# Patient Record
Sex: MALE | Race: WHITE | URBAN - METROPOLITAN AREA
[De-identification: names, ages, dates, MRNs, and addresses within clinical notes are randomized per-mention and may not be internally consistent; named-entity substitution may affect disease eponyms.]

---

## 2022-12-27 ENCOUNTER — OFFICE VISIT (OUTPATIENT)
Dept: FAMILY MEDICINE CLINIC | Facility: CLINIC | Age: 57
End: 2022-12-27
Payer: COMMERCIAL

## 2022-12-27 VITALS
RESPIRATION RATE: 18 BRPM | BODY MASS INDEX: 36.4 KG/M2 | HEIGHT: 71 IN | SYSTOLIC BLOOD PRESSURE: 157 MMHG | WEIGHT: 260 LBS | HEART RATE: 97 BPM | DIASTOLIC BLOOD PRESSURE: 92 MMHG | TEMPERATURE: 98 F | OXYGEN SATURATION: 98 %

## 2022-12-27 DIAGNOSIS — J02.0 STREP PHARYNGITIS: Primary | ICD-10-CM

## 2022-12-27 LAB
CONTROL LINE PRESENT WITH A CLEAR BACKGROUND (YES/NO): YES YES/NO
STREP GRP A CUL-SCR: POSITIVE

## 2022-12-27 PROCEDURE — 3080F DIAST BP >= 90 MM HG: CPT | Performed by: NURSE PRACTITIONER

## 2022-12-27 PROCEDURE — 87880 STREP A ASSAY W/OPTIC: CPT | Performed by: NURSE PRACTITIONER

## 2022-12-27 PROCEDURE — 3008F BODY MASS INDEX DOCD: CPT | Performed by: NURSE PRACTITIONER

## 2022-12-27 PROCEDURE — 99203 OFFICE O/P NEW LOW 30 MIN: CPT | Performed by: NURSE PRACTITIONER

## 2022-12-27 PROCEDURE — 3077F SYST BP >= 140 MM HG: CPT | Performed by: NURSE PRACTITIONER

## 2022-12-27 RX ORDER — AMOXICILLIN 500 MG/1
500 CAPSULE ORAL 2 TIMES DAILY
Qty: 20 CAPSULE | Refills: 0 | Status: SHIPPED | OUTPATIENT
Start: 2022-12-27 | End: 2023-01-06

## 2022-12-27 RX ORDER — GLIMEPIRIDE 4 MG/1
TABLET ORAL
COMMUNITY
Start: 2022-10-19

## 2022-12-27 RX ORDER — SILDENAFIL 50 MG/1
50 TABLET, FILM COATED ORAL DAILY PRN
COMMUNITY
Start: 2022-12-09

## 2022-12-27 NOTE — PATIENT INSTRUCTIONS
1. Rest. Drink plenty of fluids. 2. Supportive care as discussed. Amoxicillin as prescribed. 3. Salt water gargles three times daily  4. Use humidifier at home when possible. 5. The rapid strep test is positive. 6. Covid-19 testing sent to lab. Self quarantine per CDC guidelines. 7. Follow up with PMD in 4-5 days for re-eval. Go to the emergency department immediately if symptoms worsen, change, you develop chest discomfort, wheezing, shortness of breath, or if you have any concerns.

## 2024-11-04 ENCOUNTER — OFFICE VISIT (OUTPATIENT)
Dept: FAMILY MEDICINE CLINIC | Facility: CLINIC | Age: 59
End: 2024-11-04
Payer: COMMERCIAL

## 2024-11-04 VITALS
WEIGHT: 261.63 LBS | BODY MASS INDEX: 36.63 KG/M2 | OXYGEN SATURATION: 97 % | TEMPERATURE: 98 F | RESPIRATION RATE: 14 BRPM | HEIGHT: 71 IN | DIASTOLIC BLOOD PRESSURE: 90 MMHG | SYSTOLIC BLOOD PRESSURE: 160 MMHG | HEART RATE: 91 BPM

## 2024-11-04 DIAGNOSIS — Z13.220 LIPID SCREENING: ICD-10-CM

## 2024-11-04 DIAGNOSIS — Z12.11 COLON CANCER SCREENING: ICD-10-CM

## 2024-11-04 DIAGNOSIS — Z13.0 SCREENING FOR DEFICIENCY ANEMIA: ICD-10-CM

## 2024-11-04 DIAGNOSIS — E78.49 OTHER HYPERLIPIDEMIA: ICD-10-CM

## 2024-11-04 DIAGNOSIS — R03.0 ELEVATED BLOOD PRESSURE READING: ICD-10-CM

## 2024-11-04 DIAGNOSIS — E11.9 TYPE 2 DIABETES MELLITUS WITHOUT COMPLICATION, WITHOUT LONG-TERM CURRENT USE OF INSULIN (HCC): ICD-10-CM

## 2024-11-04 DIAGNOSIS — Z12.5 PROSTATE CANCER SCREENING: ICD-10-CM

## 2024-11-04 DIAGNOSIS — Z00.00 WELLNESS EXAMINATION: Primary | ICD-10-CM

## 2024-11-04 DIAGNOSIS — Z13.29 THYROID DISORDER SCREEN: ICD-10-CM

## 2024-11-04 DIAGNOSIS — E66.9 OBESITY (BMI 35.0-39.9 WITHOUT COMORBIDITY): ICD-10-CM

## 2024-11-04 DIAGNOSIS — R73.09 ELEVATED GLUCOSE: ICD-10-CM

## 2024-11-04 LAB
ALBUMIN SERPL-MCNC: 4.2 G/DL (ref 3.2–4.8)
ALBUMIN/GLOB SERPL: 1.1 {RATIO} (ref 1–2)
ALP LIVER SERPL-CCNC: 98 U/L
ALT SERPL-CCNC: 25 U/L
ANION GAP SERPL CALC-SCNC: 3 MMOL/L (ref 0–18)
AST SERPL-CCNC: 32 U/L (ref ?–34)
BASOPHILS # BLD AUTO: 0.06 X10(3) UL (ref 0–0.2)
BASOPHILS NFR BLD AUTO: 0.6 %
BILIRUB SERPL-MCNC: 1.8 MG/DL (ref 0.2–1.1)
BUN BLD-MCNC: 12 MG/DL (ref 9–23)
CALCIUM BLD-MCNC: 10.9 MG/DL (ref 8.7–10.4)
CHLORIDE SERPL-SCNC: 100 MMOL/L (ref 98–112)
CHOLEST SERPL-MCNC: 177 MG/DL (ref ?–200)
CO2 SERPL-SCNC: 31 MMOL/L (ref 21–32)
COMPLEXED PSA SERPL-MCNC: 0.37 NG/ML (ref ?–4)
CREAT BLD-MCNC: 0.97 MG/DL
EGFRCR SERPLBLD CKD-EPI 2021: 89 ML/MIN/1.73M2 (ref 60–?)
EOSINOPHIL # BLD AUTO: 0.39 X10(3) UL (ref 0–0.7)
EOSINOPHIL NFR BLD AUTO: 4 %
ERYTHROCYTE [DISTWIDTH] IN BLOOD BY AUTOMATED COUNT: 11.9 %
FASTING PATIENT LIPID ANSWER: NO
FASTING STATUS PATIENT QL REPORTED: NO
GLOBULIN PLAS-MCNC: 3.9 G/DL (ref 2–3.5)
GLUCOSE BLD-MCNC: 241 MG/DL (ref 70–99)
HCT VFR BLD AUTO: 46.2 %
HDLC SERPL-MCNC: 45 MG/DL (ref 40–59)
HGB BLD-MCNC: 16.2 G/DL
IMM GRANULOCYTES # BLD AUTO: 0.02 X10(3) UL (ref 0–1)
IMM GRANULOCYTES NFR BLD: 0.2 %
LDLC SERPL CALC-MCNC: 87 MG/DL (ref ?–100)
LYMPHOCYTES # BLD AUTO: 3.05 X10(3) UL (ref 1–4)
LYMPHOCYTES NFR BLD AUTO: 30.9 %
MCH RBC QN AUTO: 30.5 PG (ref 26–34)
MCHC RBC AUTO-ENTMCNC: 35.1 G/DL (ref 31–37)
MCV RBC AUTO: 86.8 FL
MONOCYTES # BLD AUTO: 0.72 X10(3) UL (ref 0.1–1)
MONOCYTES NFR BLD AUTO: 7.3 %
NEUTROPHILS # BLD AUTO: 5.62 X10 (3) UL (ref 1.5–7.7)
NEUTROPHILS # BLD AUTO: 5.62 X10(3) UL (ref 1.5–7.7)
NEUTROPHILS NFR BLD AUTO: 57 %
NONHDLC SERPL-MCNC: 132 MG/DL (ref ?–130)
OSMOLALITY SERPL CALC.SUM OF ELEC: 286 MOSM/KG (ref 275–295)
PLATELET # BLD AUTO: 273 10(3)UL (ref 150–450)
POTASSIUM SERPL-SCNC: 4.3 MMOL/L (ref 3.5–5.1)
PROT SERPL-MCNC: 8.1 G/DL (ref 5.7–8.2)
RBC # BLD AUTO: 5.32 X10(6)UL
SODIUM SERPL-SCNC: 134 MMOL/L (ref 136–145)
TRIGL SERPL-MCNC: 271 MG/DL (ref 30–149)
TSI SER-ACNC: 3.03 UIU/ML (ref 0.55–4.78)
VLDLC SERPL CALC-MCNC: 44 MG/DL (ref 0–30)
WBC # BLD AUTO: 9.9 X10(3) UL (ref 4–11)

## 2024-11-04 PROCEDURE — 85025 COMPLETE CBC W/AUTO DIFF WBC: CPT

## 2024-11-04 PROCEDURE — 80061 LIPID PANEL: CPT

## 2024-11-04 PROCEDURE — 84443 ASSAY THYROID STIM HORMONE: CPT

## 2024-11-04 PROCEDURE — 83036 HEMOGLOBIN GLYCOSYLATED A1C: CPT

## 2024-11-04 PROCEDURE — 80053 COMPREHEN METABOLIC PANEL: CPT

## 2024-11-04 RX ORDER — GLIMEPIRIDE 4 MG/1
4 TABLET ORAL
Qty: 90 TABLET | Refills: 0 | Status: SHIPPED | OUTPATIENT
Start: 2024-11-04 | End: 2025-02-02

## 2024-11-04 NOTE — PROGRESS NOTES
HPI:   Eddie Lentz is a 59 year old male who presents for an Annual Health Visit.     He is a new patient to the office.  He just moved to the area from [place].    Works for [employer].  He reports a medical history of hyperglycemia and is on glimepiride 4mg BID for this.  Past medical record requested and show history of Diabetes and hypertension.  Patients blood pressure is elevated in office today.      Allergies:   No Known Allergies    CURRENT MEDICATIONS   Current Outpatient Medications   Medication Sig Dispense Refill    glimepiride 4 MG Oral Tab       Sildenafil Citrate 50 MG Oral Tab Take 50 mg by mouth daily as needed.        HISTORICAL INFORMATION   Past Medical History:    Diabetes (HCC)      Past Surgical History:   Procedure Laterality Date    Ankle fracture surgery      Appendectomy        No family history on file.   SOCIAL HISTORY   Social History     Socioeconomic History    Marital status: Single   Tobacco Use    Smoking status: Never    Smokeless tobacco: Never     Social History     Social History Narrative    Not on file        REVIEW OF SYSTEMS:     Review of Systems   Constitutional:  Negative for activity change, appetite change and fatigue.   HENT:  Negative for congestion, hearing loss and sore throat.    Eyes:  Negative for discharge and redness.   Respiratory:  Negative for shortness of breath and wheezing.    Cardiovascular:  Negative for chest pain.   Gastrointestinal:  Negative for abdominal distention and abdominal pain.   Endocrine: Negative for cold intolerance and heat intolerance.   Genitourinary:  Negative for difficulty urinating and urgency.   Musculoskeletal:  Negative for arthralgias and back pain.   Skin:  Negative for color change.   Allergic/Immunologic: Negative for environmental allergies.   Neurological:  Negative for dizziness, syncope and headaches.   Hematological:  Negative for adenopathy. Does not bruise/bleed easily.    Psychiatric/Behavioral:  Negative for agitation, behavioral problems and confusion.          EXAM:   /90   Pulse 91   Temp 98.2 °F (36.8 °C) (Temporal)   Resp 14   Ht 5' 11\" (1.803 m)   Wt 261 lb 9.6 oz (118.7 kg)   SpO2 97%   BMI 36.49 kg/m²    Wt Readings from Last 6 Encounters:   11/04/24 261 lb 9.6 oz (118.7 kg)   12/27/22 260 lb (117.9 kg)     Body mass index is 36.49 kg/m².    Physical Exam  Constitutional:       Appearance: Normal appearance. He is obese.   HENT:      Head: Normocephalic.      Right Ear: Tympanic membrane normal.      Left Ear: Tympanic membrane normal.      Nose: Nose normal.      Mouth/Throat:      Mouth: Mucous membranes are moist.   Eyes:      Pupils: Pupils are equal, round, and reactive to light.   Cardiovascular:      Rate and Rhythm: Normal rate and regular rhythm.      Pulses: Normal pulses.      Heart sounds: Normal heart sounds.   Pulmonary:      Effort: Pulmonary effort is normal.      Breath sounds: Normal breath sounds.   Abdominal:      General: Bowel sounds are normal.      Palpations: Abdomen is soft.   Musculoskeletal:         General: Normal range of motion.      Cervical back: Normal range of motion.   Skin:     General: Skin is dry.      Capillary Refill: Capillary refill takes less than 2 seconds.   Neurological:      Mental Status: He is alert and oriented to person, place, and time.   Psychiatric:         Mood and Affect: Mood normal.         Behavior: Behavior normal.          ASSESSMENT AND PLAN:   Eddie was seen today for physical.    Diagnoses and all orders for this visit:    Wellness examination  -     Comp Metabolic Panel (14); Future  -     CBC With Differential With Platelet; Future  -     Hemoglobin A1C; Future  -     Lipid Panel; Future  -     TSH W Reflex To Free T4; Future  -     Comp Metabolic Panel (14)  -     CBC With Differential With Platelet  -     Hemoglobin A1C  -     Lipid Panel  -     TSH W Reflex To Free T4    Colon cancer  screening  -     [facility] - INT (MUST PICK PROVIDER)    Elevated glucose  -     Hemoglobin A1C; Future  -     Hemoglobin A1C    Lipid screening  -     Lipid Panel; Future  -     Lipid Panel    Prostate cancer screening  -     PSA Screen; Future  -     PSA Screen    Screening for deficiency anemia  -     CBC With Differential With Platelet; Future  -     CBC With Differential With Platelet    Thyroid disorder screen  -     TSH W Reflex To Free T4; Future  -     TSH W Reflex To Free T4    Type 2 diabetes mellitus without complication, without long-term current use of insulin (HCC)  -     glimepiride 4 MG Oral Tab; Take 1 tablet (4 mg total) by mouth every morning before breakfast.  -     Tirzepatide (MOUNJARO) 2.5 MG/0.5ML Subcutaneous Solution Auto-injector; Inject 2.5 mg into the skin once a week.    Other hyperlipidemia    Obesity (BMI 35.0-39.9 without comorbidity)    Elevated blood pressure reading  Comments:  patient instructed to take his BP nightly for the next 5 days and contact office with readings.      The patient indicates understanding of these issues and agrees to the plan.    Problem List:  Patient Active Problem List   Diagnosis    Type 2 diabetes mellitus without complication, without long-term current use of insulin (HCC)    Other hyperlipidemia    Obesity (BMI 35.0-39.9 without comorbidity)     [facility]     LUZ MARIA Hale  11/4/2024  2:04 PM

## 2024-11-05 ENCOUNTER — TELEPHONE (OUTPATIENT)
Dept: FAMILY MEDICINE CLINIC | Facility: CLINIC | Age: 59
End: 2024-11-05

## 2024-11-05 LAB
EST. AVERAGE GLUCOSE BLD GHB EST-MCNC: 295 MG/DL (ref 68–126)
HBA1C MFR BLD: 11.9 % (ref ?–5.7)

## 2024-11-05 NOTE — TELEPHONE ENCOUNTER
Faxed over medical record request to [facility] to [phone].  Received conformation that it went through successfully.

## 2024-11-08 DIAGNOSIS — E11.9 TYPE 2 DIABETES MELLITUS WITHOUT COMPLICATION, WITHOUT LONG-TERM CURRENT USE OF INSULIN (HCC): Primary | ICD-10-CM

## 2024-11-08 DIAGNOSIS — E78.00 HYPERCHOLESTEROLEMIA: ICD-10-CM

## 2024-11-08 PROBLEM — E78.49 OTHER HYPERLIPIDEMIA: Status: ACTIVE | Noted: 2024-11-08

## 2024-11-08 PROBLEM — E66.9 OBESITY (BMI 35.0-39.9 WITHOUT COMORBIDITY): Status: ACTIVE | Noted: 2024-11-08

## 2024-11-08 RX ORDER — ATORVASTATIN CALCIUM 10 MG/1
10 TABLET, FILM COATED ORAL NIGHTLY
Qty: 90 TABLET | Refills: 0 | Status: SHIPPED | OUTPATIENT
Start: 2024-11-08 | End: 2025-02-06

## 2024-11-08 RX ORDER — TIRZEPATIDE 2.5 MG/.5ML
2.5 INJECTION, SOLUTION SUBCUTANEOUS WEEKLY
Qty: 3 ML | Refills: 0 | Status: SHIPPED | OUTPATIENT
Start: 2024-11-08 | End: 2024-12-08

## 2024-11-09 ENCOUNTER — MED REC SCAN ONLY (OUTPATIENT)
Dept: FAMILY MEDICINE CLINIC | Facility: CLINIC | Age: 59
End: 2024-11-09

## 2024-11-09 RX ORDER — ATORVASTATIN CALCIUM 10 MG/1
10 TABLET, FILM COATED ORAL NIGHTLY
Qty: 90 TABLET | Refills: 1 | Status: SHIPPED | OUTPATIENT
Start: 2024-11-09

## 2024-11-11 ENCOUNTER — NURSE ONLY (OUTPATIENT)
Dept: FAMILY MEDICINE CLINIC | Facility: CLINIC | Age: 59
End: 2024-11-11
Payer: COMMERCIAL

## 2024-11-11 NOTE — PROGRESS NOTES
Patient brought Mounjaro in to receive instructions how to self administer. Patient states that the Mounjaro was left out of the refrigerator in the car for about 24 hours. It was not in the sun or heat. Pharmacist at [facility] states that the medication is stable outside the refrigerator for 21 days as long as the temp was not under 30 degrees C or over 86 degrees F. Patient administered the injection subcutaneously two inches right of the umbilicus with correct technique.

## 2024-11-26 DIAGNOSIS — E11.9 TYPE 2 DIABETES MELLITUS WITHOUT COMPLICATION, WITHOUT LONG-TERM CURRENT USE OF INSULIN (HCC): ICD-10-CM

## 2024-11-26 RX ORDER — TIRZEPATIDE 2.5 MG/.5ML
2.5 INJECTION, SOLUTION SUBCUTANEOUS WEEKLY
Qty: 3 ML | Refills: 0 | Status: SHIPPED | OUTPATIENT
Start: 2024-11-26 | End: 2024-12-26

## 2024-11-29 ENCOUNTER — TELEPHONE (OUTPATIENT)
Dept: FAMILY MEDICINE CLINIC | Facility: CLINIC | Age: 59
End: 2024-11-29

## 2024-11-29 NOTE — TELEPHONE ENCOUNTER
Patient has some questions and isn't feeling well, would like to speak with nurse. Patient is taking mounjaro and is experiencing pain in stomach and diarrhea. Unsure if its caused from medication would like to speak with nurse.

## 2024-11-29 NOTE — TELEPHONE ENCOUNTER
Patient notified of provider recommendations/comments. Verbalized understanding and agrees to call office next week with status update.

## 2024-11-29 NOTE — TELEPHONE ENCOUNTER
Spoke with patient who states he started Mounjaro 3 weeks ago. About a week and a half ago he started feeling stomach discomfort. He states it feels like an air bubble in his stomach. He has tried GasX without relief. He states he feels bloated in the center of his stomach. Today he started with Diarrhea. He is wondering if this could be from the Mounjaro? He takes his next injection on Monday. He is wondering if a covering provider could review this. He denies any fever, nausea or vomiting. Please advise.

## 2024-11-29 NOTE — TELEPHONE ENCOUNTER
The symptoms he is describing very well may be from the Mounjaro.  I would ask him to hold his next dose to see if symptoms resolve over time.  I would ask him to maintain a very bland diet.  Please report symptoms to Dr. Govea after the weekend

## 2024-12-02 ENCOUNTER — TELEPHONE (OUTPATIENT)
Dept: FAMILY MEDICINE CLINIC | Facility: CLINIC | Age: 59
End: 2024-12-02

## 2024-12-02 DIAGNOSIS — E11.9 TYPE 2 DIABETES MELLITUS WITHOUT COMPLICATION, WITHOUT LONG-TERM CURRENT USE OF INSULIN (HCC): ICD-10-CM

## 2024-12-02 NOTE — TELEPHONE ENCOUNTER
Patient said that he had been taking Mounjaro and he was getting bad stomach cramps, nausea and diarrhea. He was advised to hold the dose and report symptoms.   He said that he has taken the injection 3x. The last dose was 1 week ago. He said he is feeling much better now. He said he heard of a study that the Mounjara can weaken the cells of the heart. He also stopped the Lipitor. Should he restart that?   He also aked if he should increase the Glimipiride to 2x daily, he has only been taking it once daily. He said he has not been checking his blood sugar because he needs to get his glucometer from his house.

## 2024-12-03 RX ORDER — GLIMEPIRIDE 4 MG/1
TABLET ORAL
Qty: 1 TABLET | Refills: 0 | Status: SHIPPED | OUTPATIENT
Start: 2024-12-03 | End: 2024-12-05

## 2024-12-03 NOTE — TELEPHONE ENCOUNTER
Yes, he should take 2 glimepiride and take his  lipitor and return this month to see me for BP recheck.

## 2024-12-03 NOTE — TELEPHONE ENCOUNTER
Patient advised.  Verbalizes understanding.  States he will call back to schedule appointment.   Medication list updated.

## 2024-12-05 ENCOUNTER — TELEPHONE (OUTPATIENT)
Dept: FAMILY MEDICINE CLINIC | Facility: CLINIC | Age: 59
End: 2024-12-05

## 2024-12-05 DIAGNOSIS — E11.9 TYPE 2 DIABETES MELLITUS WITHOUT COMPLICATION, WITHOUT LONG-TERM CURRENT USE OF INSULIN (HCC): ICD-10-CM

## 2024-12-05 RX ORDER — GLIMEPIRIDE 4 MG/1
8 TABLET ORAL
Qty: 180 TABLET | Refills: 0 | Status: SHIPPED | OUTPATIENT
Start: 2024-12-05 | End: 2025-03-05

## 2024-12-05 NOTE — TELEPHONE ENCOUNTER
glimepiride 4 MG Oral Tab   Calling to clarify the quantity. The dosing the directions do not add up.

## 2025-01-02 ENCOUNTER — TELEPHONE (OUTPATIENT)
Dept: FAMILY MEDICINE CLINIC | Facility: CLINIC | Age: 60
End: 2025-01-02

## 2025-01-02 DIAGNOSIS — E11.9 TYPE 2 DIABETES MELLITUS WITHOUT COMPLICATION, WITHOUT LONG-TERM CURRENT USE OF INSULIN (HCC): ICD-10-CM

## 2025-01-02 RX ORDER — GLIMEPIRIDE 4 MG/1
8 TABLET ORAL
Qty: 180 TABLET | Refills: 0 | Status: SHIPPED | OUTPATIENT
Start: 2025-01-02 | End: 2025-04-02

## 2025-02-08 ENCOUNTER — NURSE ONLY (OUTPATIENT)
Dept: FAMILY MEDICINE CLINIC | Facility: CLINIC | Age: 60
End: 2025-02-08
Payer: COMMERCIAL

## 2025-02-08 DIAGNOSIS — E78.00 HYPERCHOLESTEROLEMIA: ICD-10-CM

## 2025-02-08 DIAGNOSIS — E11.9 TYPE 2 DIABETES MELLITUS WITHOUT COMPLICATION, WITHOUT LONG-TERM CURRENT USE OF INSULIN (HCC): ICD-10-CM

## 2025-02-08 LAB
ALBUMIN SERPL-MCNC: 4.3 G/DL (ref 3.2–4.8)
ALBUMIN/GLOB SERPL: 1.4 {RATIO} (ref 1–2)
ALP LIVER SERPL-CCNC: 109 U/L
ALT SERPL-CCNC: 20 U/L
ANION GAP SERPL CALC-SCNC: 9 MMOL/L (ref 0–18)
AST SERPL-CCNC: 26 U/L (ref ?–34)
BILIRUB SERPL-MCNC: 1.6 MG/DL (ref 0.2–1.1)
BUN BLD-MCNC: 14 MG/DL (ref 9–23)
CALCIUM BLD-MCNC: 9.5 MG/DL (ref 8.7–10.6)
CHLORIDE SERPL-SCNC: 102 MMOL/L (ref 98–112)
CHOLEST SERPL-MCNC: 119 MG/DL (ref ?–200)
CO2 SERPL-SCNC: 26 MMOL/L (ref 21–32)
CREAT BLD-MCNC: 1.05 MG/DL
EGFRCR SERPLBLD CKD-EPI 2021: 81 ML/MIN/1.73M2 (ref 60–?)
EST. AVERAGE GLUCOSE BLD GHB EST-MCNC: 312 MG/DL (ref 68–126)
FASTING PATIENT LIPID ANSWER: YES
FASTING STATUS PATIENT QL REPORTED: YES
GLOBULIN PLAS-MCNC: 3.1 G/DL (ref 2–3.5)
GLUCOSE BLD-MCNC: 325 MG/DL (ref 70–99)
HBA1C MFR BLD: 12.5 % (ref ?–5.7)
HDLC SERPL-MCNC: 42 MG/DL (ref 40–59)
LDLC SERPL CALC-MCNC: 55 MG/DL (ref ?–100)
NONHDLC SERPL-MCNC: 77 MG/DL (ref ?–130)
OSMOLALITY SERPL CALC.SUM OF ELEC: 297 MOSM/KG (ref 275–295)
POTASSIUM SERPL-SCNC: 4.5 MMOL/L (ref 3.5–5.1)
PROT SERPL-MCNC: 7.4 G/DL (ref 5.7–8.2)
SODIUM SERPL-SCNC: 137 MMOL/L (ref 136–145)
TRIGL SERPL-MCNC: 123 MG/DL (ref 30–149)
VLDLC SERPL CALC-MCNC: 18 MG/DL (ref 0–30)

## 2025-02-08 PROCEDURE — 80053 COMPREHEN METABOLIC PANEL: CPT

## 2025-02-08 PROCEDURE — 80061 LIPID PANEL: CPT

## 2025-02-08 PROCEDURE — 36415 COLL VENOUS BLD VENIPUNCTURE: CPT

## 2025-02-08 PROCEDURE — 83036 HEMOGLOBIN GLYCOSYLATED A1C: CPT

## 2025-02-20 ENCOUNTER — TELEPHONE (OUTPATIENT)
Dept: FAMILY MEDICINE CLINIC | Facility: CLINIC | Age: 60
End: 2025-02-20

## 2025-02-20 ENCOUNTER — OFFICE VISIT (OUTPATIENT)
Dept: FAMILY MEDICINE CLINIC | Facility: CLINIC | Age: 60
End: 2025-02-20
Payer: COMMERCIAL

## 2025-02-20 VITALS
HEART RATE: 83 BPM | RESPIRATION RATE: 21 BRPM | OXYGEN SATURATION: 96 % | BODY MASS INDEX: 36.15 KG/M2 | DIASTOLIC BLOOD PRESSURE: 100 MMHG | SYSTOLIC BLOOD PRESSURE: 135 MMHG | TEMPERATURE: 98 F | HEIGHT: 71 IN | WEIGHT: 258.19 LBS

## 2025-02-20 DIAGNOSIS — I10 PRIMARY HYPERTENSION: ICD-10-CM

## 2025-02-20 DIAGNOSIS — E11.9 TYPE 2 DIABETES MELLITUS WITHOUT COMPLICATION, WITHOUT LONG-TERM CURRENT USE OF INSULIN (HCC): Primary | ICD-10-CM

## 2025-02-20 DIAGNOSIS — E78.00 HYPERCHOLESTEROLEMIA: ICD-10-CM

## 2025-02-20 LAB
CREAT UR-SCNC: 39.2 MG/DL
MICROALBUMIN UR-MCNC: <0.3 MG/DL

## 2025-02-20 PROCEDURE — 99214 OFFICE O/P EST MOD 30 MIN: CPT

## 2025-02-20 PROCEDURE — 82043 UR ALBUMIN QUANTITATIVE: CPT

## 2025-02-20 PROCEDURE — 82570 ASSAY OF URINE CREATININE: CPT

## 2025-02-20 RX ORDER — SEMAGLUTIDE 0.68 MG/ML
0.25 INJECTION, SOLUTION SUBCUTANEOUS WEEKLY
Qty: 1 EACH | Refills: 0 | Status: SHIPPED | OUTPATIENT
Start: 2025-02-20

## 2025-02-20 RX ORDER — MULTIVITAMIN
2 TABLET ORAL DAILY
COMMUNITY

## 2025-02-20 NOTE — TELEPHONE ENCOUNTER
Has new prescription and patient states that need a nurse visit to be shown how to properly use. Asked if OMAR Morillo was available for nurse visit, forwarding to clinical to schedule

## 2025-02-20 NOTE — TELEPHONE ENCOUNTER
Called Barrett and set up nurse appt. To teach him how to use Ozempic.    Future Appointments   Date Time Provider Department Center   2/24/2025  4:00 PM [facility] [place]

## 2025-02-20 NOTE — TELEPHONE ENCOUNTER
Authorization number: [id number]   Authorized from February 20, 2025 to February 20, 2026  Payer: [employer] Commerical Case ID: [id number]    Phone: [phone]     Fax: [phone]       Called Barrett and let him know.  Set up a nurse appt. To teach him how to use Ozempic.    Future Appointments   Date Time Provider Department Center   2/24/2025  4:00 PM [facility] [place]

## 2025-02-20 NOTE — TELEPHONE ENCOUNTER
Started Prior Authorization started 2/20/25 through Epic.  Awaiting response.

## 2025-02-20 NOTE — PROGRESS NOTES
Eddie Lentz is a 59 year old male.  HPI:     Patient in office for diabetic follow up.  He takes 4 mg of glimepiride BID.  Patient new to this office upon last visit 3 months ago.  He reported he did not have diabetes but was told by prior primary care physician he has hyperglycemia.  HgbA1c 3 months ago was 11.9 are medical record sent from prior office show history of diabetes.  He reports he was unable to tolerate metformin due to gi side effects.  He was started on monjaro but discontinued this due to side effects after 3 weeks.  He was instructed by covering provider to increase glimepiride to twice a day (was originally once a day) and instructed to follow in within 4 weeks.  Patient did not follow up.  He did his labs last week and ha1c is now 12.5.  Cholesterol in range.  Currently takes atorvastatin 10 mg nightly.    Blood pressure was elevated in office 3 months ago.  Patient has been monitoring his blood pressures at home with a wrist cuff.  Home numbers are 130's/80.  Spoke with patient about wrist cuffs not being as accurate and recommended an arm cuff for monitoring.  Per patients past medical records he has a history of hypertension.  He is not currently on any medication for this.      Current Outpatient Medications   Medication Sig Dispense Refill    glimepiride 4 MG Oral Tab Take 2 tablets (8 mg total) by mouth every morning before breakfast. Take two tablets to = 8 mg daily 180 tablet 0    atorvastatin 10 MG Oral Tab Take 1 tablet (10 mg total) by mouth nightly. 90 tablet 1    Sildenafil Citrate 50 MG Oral Tab Take 1 tablet (50 mg total) by mouth daily as needed.        Past Medical History:    Diabetes (HCC)      Social History:  Social History     Socioeconomic History    Marital status: Single   Tobacco Use    Smoking status: Never     Passive exposure: Never    Smokeless tobacco: Never   Vaping Use    Vaping status: Never Used   Substance and Sexual Activity    Drug use: Never           REVIEW OF SYSTEMS:   GENERAL HEALTH: feels well otherwise  SKIN: denies any unusual skin lesions or rashes  RESPIRATORY: denies shortness of breath with exertion  CARDIOVASCULAR: denies chest pain on exertion  GI: denies abdominal pain and denies heartburn  NEURO: denies headaches      EXAM:   BP (!) 135/100   Pulse 83   Temp 97.7 °F (36.5 °C) (Temporal)   Resp 21   Ht 5' 11\" (1.803 m)   Wt 258 lb 3.2 oz (117.1 kg)   SpO2 96%   BMI 36.01 kg/m²   GENERAL: well developed, well nourished,in no apparent distress  SKIN: no rashes,no suspicious lesions  HEENT: atraumatic, normocephalic,ears and throat are clear  NECK: supple,no adenopathy,no bruits  LUNGS: clear to auscultation  CARDIO: RRR without murmur  GI: good BS's,no masses, HSM or tenderness  EXTREMITIES: no cyanosis, clubbing or edema    Bilateral barefoot skin diabetic exam is normal, visualized feet and the appearance is normal.  Bilateral monofilament/sensation of both feet is normal.  Pulsation pedal pulse exam of both lower legs/feet is normal as well.       ASSESSMENT AND PLAN:     1. Type 2 diabetes mellitus without complication, without long-term current use of insulin (HCC)  Ozempic sent to pharmacy (PA initiated).  Spoke with patient that other GLP1 medications will likely have same side effects.  He reports a friend failing the first 2 injectable medications they tried but the 3rd one being successful.  He would like to try another injectable medication.  Spoke about patients average glucose being over 300 and needed to change diet.  Referral placed for diabetic education.   - OP REFERRAL TO DIAB NUTRITION MANAGEMENT 3 HRS  - Microalb/Creat Ratio, Random Urine; Future  - semaglutide (OZEMPIC, 0.25 OR 0.5 MG/DOSE,) 2 MG/3ML Subcutaneous Solution Pen-injector; Inject 0.25 mg into the skin once a week.  Dispense: 1 each; Refill: 0  - Microalb/Creat Ratio, Random Urine    2. Hypercholesterolemia  Continue atorvastatin nightly    3. Primary  hypertension  Recommended patient take blood pressures nightly for the next 3 nights and report readings to office.  Spoke with patient about likely needing medication management due to the diabetes and hyperlipidemia.     The patient indicates understanding of these issues and agrees to the plan.  The patient is asked to return in 3 months for diabetic follow up and sooner if needed.  Will be reaching out to patient in 4 days regarding blood pressures.     Leilani Flores, APRN  2/20/2025

## 2025-02-24 ENCOUNTER — NURSE ONLY (OUTPATIENT)
Dept: FAMILY MEDICINE CLINIC | Facility: CLINIC | Age: 60
End: 2025-02-24
Payer: COMMERCIAL

## 2025-02-24 NOTE — PROGRESS NOTES
Patient brought Ozempic from home. Lot # [id number] exp 8/31/27. He states he has kept it in the refrigerator. Instructions how to self administer medication reviewed. Needle applied to the pen, dose titrated to 0.25mg. Patient administered Ozempic 0.25mg subcutaneously to the right of the umbilicus using aseptic technique.

## 2025-03-20 ENCOUNTER — TELEPHONE (OUTPATIENT)
Dept: FAMILY MEDICINE CLINIC | Facility: CLINIC | Age: 60
End: 2025-03-20

## 2025-03-31 DIAGNOSIS — E11.9 TYPE 2 DIABETES MELLITUS WITHOUT COMPLICATION, WITHOUT LONG-TERM CURRENT USE OF INSULIN (HCC): ICD-10-CM

## 2025-03-31 RX ORDER — GLIMEPIRIDE 4 MG/1
8 TABLET ORAL
Qty: 180 TABLET | Refills: 0 | Status: SHIPPED | OUTPATIENT
Start: 2025-03-31

## 2025-03-31 NOTE — TELEPHONE ENCOUNTER
GLIMEPIRIDE 4 MG Oral Tab   Diabetes Medication Protocol [id number]/31/2025 02:35 PM   Protocol Details Last A1C < 7.5 and within past 6 months    Medication is active on med list    In person appointment or virtual visit in the past 6 mos or appointment in next 3 mos    Microalbumin procedure in past 12 months or taking ACE/ARB    EGFRCR or GFRNAA > 50    GFR in the past 12 months   Last office visit:  2/20/25   Future Appointments   Date Time Provider Department Center   5/20/2025 10:00 AM Leilani Flores APRN [facility] [place]   Last filled:  1/2/25  #180  Last labs: 2/20/25 micro/creat, 2/8/25 A1C: 12.5, eGFR-CR: 81  Last BP:  135/100

## 2025-04-01 DIAGNOSIS — E11.9 TYPE 2 DIABETES MELLITUS WITHOUT COMPLICATION, WITHOUT LONG-TERM CURRENT USE OF INSULIN (HCC): ICD-10-CM

## 2025-04-01 RX ORDER — SEMAGLUTIDE 0.68 MG/ML
0.25 INJECTION, SOLUTION SUBCUTANEOUS WEEKLY
Qty: 1 EACH | Refills: 0 | Status: SHIPPED | OUTPATIENT
Start: 2025-04-01

## 2025-04-01 NOTE — TELEPHONE ENCOUNTER
Patient needs a refill on-    semaglutide (OZEMPIC, 0.25 OR 0.5 MG/DOSE,) 2 MG/3ML Subcutaneous Solution Pen-injector     [facility] [address] [place] [address] [address] [phone], [phone]     Thank you

## 2025-04-01 NOTE — TELEPHONE ENCOUNTER
Last OV was with Leilani Flores APRN was on 2/20/25.   Last Hgba1c was 2/8/25  Last refill was 2/20/25 for 2mg

## 2025-05-12 ENCOUNTER — TELEPHONE (OUTPATIENT)
Dept: FAMILY MEDICINE CLINIC | Facility: CLINIC | Age: 60
End: 2025-05-12

## 2025-05-12 RX ORDER — SEMAGLUTIDE 0.68 MG/ML
0.5 INJECTION, SOLUTION SUBCUTANEOUS WEEKLY
Qty: 1 EACH | Refills: 12 | Status: SHIPPED | OUTPATIENT
Start: 2025-05-12 | End: 2026-05-05

## 2025-05-12 NOTE — TELEPHONE ENCOUNTER
Last Ov w/Leilani Flores APRN 2/20/25  Last Hgba1c was 2/8/25  Last refill 4/1/25 0.25mg  Does the dose need to be increased?

## 2025-05-15 ENCOUNTER — OFFICE VISIT (OUTPATIENT)
Dept: FAMILY MEDICINE CLINIC | Facility: CLINIC | Age: 60
End: 2025-05-15
Payer: COMMERCIAL

## 2025-05-15 VITALS
BODY MASS INDEX: 35 KG/M2 | DIASTOLIC BLOOD PRESSURE: 88 MMHG | SYSTOLIC BLOOD PRESSURE: 126 MMHG | TEMPERATURE: 98 F | OXYGEN SATURATION: 97 % | HEART RATE: 100 BPM | WEIGHT: 250 LBS

## 2025-05-15 DIAGNOSIS — L03.039 PARONYCHIA OF GREAT TOE: ICD-10-CM

## 2025-05-15 DIAGNOSIS — E11.9 TYPE 2 DIABETES MELLITUS WITHOUT COMPLICATION, WITHOUT LONG-TERM CURRENT USE OF INSULIN (HCC): Primary | ICD-10-CM

## 2025-05-15 LAB — HEMOGLOBIN A1C: 10.3 % (ref 4.3–5.6)

## 2025-05-15 PROCEDURE — 99214 OFFICE O/P EST MOD 30 MIN: CPT

## 2025-05-15 PROCEDURE — 83036 HEMOGLOBIN GLYCOSYLATED A1C: CPT

## 2025-05-15 RX ORDER — CEPHALEXIN 250 MG/5ML
500 POWDER, FOR SUSPENSION ORAL 3 TIMES DAILY
Qty: 300 ML | Refills: 0 | Status: SHIPPED | OUTPATIENT
Start: 2025-05-15 | End: 2025-05-25

## 2025-05-15 NOTE — PROGRESS NOTES
Eddie Lentz is a 59 year old male.  HPI:     Patient in office for diabetic follow up.  He was seen in office 3 months ago and diabetes was not controlled at that time with a hemaglobin HgbA1c of 12.5.  He was started on monjaro but discontinued after 2 doses due to side effects.  3 months ago he was started on ozempic.  He reports no side effects with ozempic and HgbA1c in office today is 10.3.    Reports pain to great toe that started one month ago.  He reports he injured his toe at work (stubbed his toe).  Reports constant pain and occasional blood.      Current Medications[1]   Past Medical History[2]   Social History:  Short Social Hx on File[3]       REVIEW OF SYSTEMS:     Review of Systems   Constitutional:  Negative for activity change, appetite change and fatigue.   HENT:  Negative for congestion, hearing loss and sore throat.    Eyes:  Negative for discharge and redness.   Respiratory:  Negative for shortness of breath and wheezing.    Cardiovascular:  Negative for chest pain.   Gastrointestinal:  Negative for abdominal distention and abdominal pain.   Endocrine: Negative for cold intolerance and heat intolerance.   Genitourinary:  Negative for difficulty urinating and urgency.   Musculoskeletal:  Negative for arthralgias and back pain.   Skin:  Negative for color change.   Allergic/Immunologic: Negative for environmental allergies.   Neurological:  Negative for dizziness, syncope and headaches.   Hematological:  Negative for adenopathy. Does not bruise/bleed easily.   Psychiatric/Behavioral:  Negative for agitation, behavioral problems and confusion.        EXAM:   /88   Pulse 100   Temp 97.9 °F (36.6 °C) (Temporal)   Wt 250 lb (113.4 kg)   SpO2 97%   BMI 34.87 kg/m²     Physical Exam  Constitutional:       Appearance: Normal appearance.   HENT:      Nose: Nose normal.      Mouth/Throat:      Mouth: Mucous membranes are moist.   Eyes:      Pupils: Pupils are equal, round, and  reactive to light.   Cardiovascular:      Rate and Rhythm: Normal rate and regular rhythm.      Pulses: Normal pulses.      Heart sounds: Normal heart sounds.   Pulmonary:      Effort: Pulmonary effort is normal.      Breath sounds: Normal breath sounds.   Musculoskeletal:         General: Normal range of motion.      Cervical back: Normal range of motion.   Skin:     General: Skin is dry.      Capillary Refill: Capillary refill takes less than 2 seconds.   Neurological:      Mental Status: He is alert and oriented to person, place, and time.   Psychiatric:         Mood and Affect: Mood normal.         Behavior: Behavior normal.          ASSESSMENT AND PLAN:     1. Type 2 diabetes mellitus without complication, without long-term current use of insulin ([facility])  HgbA1c 10.2 in office.  Patient to continue with ozempic 0.5 mg weekly and glimepiride 4 mg daily while continuing to watch diet and exercise.  - POC Hemoglobin A1C    2. Paronychia of great toe  Keflex sent to pharmacy and instructions provided  - cephALEXin 250 MG/5ML Oral Recon Susp; Take 10 mL (500 mg total) by mouth 3 (three) times daily for 10 days. For 10 days  Dispense: 300 mL; Refill: 0    The patient indicates understanding of these issues and agrees to the plan.      Leilani Flores, APRN  5/15/2025           [1]   Current Outpatient Medications   Medication Sig Dispense Refill    semaglutide (OZEMPIC, 0.25 OR 0.5 MG/DOSE,) 2 MG/3ML Subcutaneous Solution Pen-injector Inject 0.5 mg into the skin once a week for 52 doses. 1 each 12    GLIMEPIRIDE 4 MG Oral Tab TAKE 2 TABLETS BY MOUTH ONCE DAILY IN THE MORNING BEFORE BREAKFAST 180 tablet 0    Multiple Vitamin (MULTI-VITAMIN) Oral Tab Take 2 tablets by mouth daily.      atorvastatin 10 MG Oral Tab Take 1 tablet (10 mg total) by mouth nightly. 90 tablet 1    Sildenafil Citrate 50 MG Oral Tab Take 1 tablet (50 mg total) by mouth daily as needed.     [2]   Past Medical History:   Diabetes ([facility])   [3]   Social  History  Socioeconomic History    Marital status: Single   Tobacco Use    Smoking status: Never     Passive exposure: Never    Smokeless tobacco: Never   Vaping Use    Vaping status: Never Used   Substance and Sexual Activity    Drug use: Never     Social Drivers of Health     Food Insecurity: No Food Insecurity (2/20/2025)    NCSS - Food Insecurity     Worried About Running Out of Food in the Last Year: No     Ran Out of Food in the Last Year: No   Transportation Needs: No Transportation Needs (2/20/2025)    NCSS - Transportation     Lack of Transportation: No   Housing Stability: Not At Risk (2/20/2025)    NCSS - Housing/Utilities     Has Housing: Yes     Worried About Losing Housing: No     Unable to Get Utilities: No

## 2025-05-29 ENCOUNTER — TELEPHONE (OUTPATIENT)
Dept: FAMILY MEDICINE CLINIC | Facility: CLINIC | Age: 60
End: 2025-05-29

## 2025-05-29 DIAGNOSIS — L08.9 FOOT INFECTION: Primary | ICD-10-CM

## 2025-05-29 NOTE — TELEPHONE ENCOUNTER
Patient said that he finished the antibiotics Saturday. He said that he had been squeezing out pus. He said that  the toe is not longer draining or red but it is still painful if her bumps it. He said that he is just finishing the third round of antibiotics today.

## 2025-05-29 NOTE — TELEPHONE ENCOUNTER
Patient advised. He said I will just \"tough it out\".   Patient advised that he is at increased risk of infection and decreased circulation due to Diabetes so he should make an appointment. He said he is in [place] right now. Contact information sent to patient through [employer].

## 2025-07-05 DIAGNOSIS — E11.9 TYPE 2 DIABETES MELLITUS WITHOUT COMPLICATION, WITHOUT LONG-TERM CURRENT USE OF INSULIN (HCC): ICD-10-CM

## 2025-07-05 RX ORDER — GLIMEPIRIDE 4 MG/1
8 TABLET ORAL
Qty: 180 TABLET | Refills: 0 | Status: SHIPPED | OUTPATIENT
Start: 2025-07-05

## 2025-07-05 NOTE — TELEPHONE ENCOUNTER
Last refill: 03/31/25  Qty 180  W/ 0 refills  Last ov: 05/15/25  Last labs 02/08/25    Requested Prescriptions     Pending Prescriptions Disp Refills    GLIMEPIRIDE 4 MG Oral Tab [Pharmacy Med Name: Glimepiride 4 MG Oral Tablet] 180 tablet 0     Sig: TAKE 2 TABLETS BY MOUTH ONCE DAILY IN THE MORNING BEFORE BREAKFAST     No future appointments.

## 2025-08-04 RX ORDER — ATORVASTATIN CALCIUM 10 MG/1
10 TABLET, FILM COATED ORAL NIGHTLY
Qty: 90 TABLET | Refills: 0 | Status: SHIPPED | OUTPATIENT
Start: 2025-08-04

## 2025-08-15 ENCOUNTER — ANESTHESIA EVENT (OUTPATIENT)
Dept: ENDOSCOPY | Facility: HOSPITAL | Age: 60
End: 2025-08-15

## 2025-08-15 ENCOUNTER — HOSPITAL ENCOUNTER (OUTPATIENT)
Age: 60
Discharge: ANOTHER HOSPITAL | End: 2025-08-15

## 2025-08-15 ENCOUNTER — HOSPITAL ENCOUNTER (EMERGENCY)
Facility: HOSPITAL | Age: 60
Discharge: HOME | End: 2025-08-15
Attending: EMERGENCY MEDICINE

## 2025-08-15 ENCOUNTER — ANESTHESIA (OUTPATIENT)
Dept: ENDOSCOPY | Facility: HOSPITAL | Age: 60
End: 2025-08-15

## 2025-08-15 VITALS
DIASTOLIC BLOOD PRESSURE: 85 MMHG | RESPIRATION RATE: 16 BRPM | HEART RATE: 91 BPM | TEMPERATURE: 97 F | WEIGHT: 260 LBS | BODY MASS INDEX: 36 KG/M2 | SYSTOLIC BLOOD PRESSURE: 132 MMHG | OXYGEN SATURATION: 97 %

## 2025-08-15 VITALS
DIASTOLIC BLOOD PRESSURE: 105 MMHG | OXYGEN SATURATION: 97 % | RESPIRATION RATE: 18 BRPM | TEMPERATURE: 98 F | HEART RATE: 122 BPM | SYSTOLIC BLOOD PRESSURE: 126 MMHG

## 2025-08-15 DIAGNOSIS — T18.128A ESOPHAGEAL OBSTRUCTION DUE TO FOOD IMPACTION: Primary | ICD-10-CM
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
## 2025-08-15 DIAGNOSIS — T17.208A FOREIGN BODY IN PHARYNX, INITIAL ENCOUNTER: Primary | ICD-10-CM

## 2025-08-15 PROCEDURE — 99285 EMERGENCY DEPT VISIT HI MDM: CPT

## 2025-08-15 PROCEDURE — 88305 TISSUE EXAM BY PATHOLOGIST: CPT | Performed by: INTERNAL MEDICINE

## 2025-08-15 PROCEDURE — 96374 THER/PROPH/DIAG INJ IV PUSH: CPT

## 2025-08-15 PROCEDURE — 99205 OFFICE O/P NEW HI 60 MIN: CPT | Performed by: NURSE PRACTITIONER

## 2025-08-15 RX ORDER — DEXAMETHASONE SODIUM PHOSPHATE 4 MG/ML
VIAL (ML) INJECTION AS NEEDED
Status: DISCONTINUED | OUTPATIENT
Start: 2025-08-15 | End: 2025-08-15 | Stop reason: SURG

## 2025-08-15 RX ORDER — HYDROMORPHONE HYDROCHLORIDE 1 MG/ML
0.4 INJECTION, SOLUTION INTRAMUSCULAR; INTRAVENOUS; SUBCUTANEOUS EVERY 5 MIN PRN
Status: DISCONTINUED | OUTPATIENT
Start: 2025-08-15 | End: 2025-08-15 | Stop reason: HOSPADM

## 2025-08-15 RX ORDER — NICOTINE POLACRILEX 4 MG
15 LOZENGE BUCCAL
Status: DISCONTINUED | OUTPATIENT
Start: 2025-08-15 | End: 2025-08-15 | Stop reason: HOSPADM

## 2025-08-15 RX ORDER — SODIUM CHLORIDE, SODIUM LACTATE, POTASSIUM CHLORIDE, CALCIUM CHLORIDE 600; 310; 30; 20 MG/100ML; MG/100ML; MG/100ML; MG/100ML
INJECTION, SOLUTION INTRAVENOUS CONTINUOUS
Status: DISCONTINUED | OUTPATIENT
Start: 2025-08-15 | End: 2025-08-15 | Stop reason: HOSPADM

## 2025-08-15 RX ORDER — NICOTINE POLACRILEX 4 MG
30 LOZENGE BUCCAL
Status: DISCONTINUED | OUTPATIENT
Start: 2025-08-15 | End: 2025-08-15 | Stop reason: HOSPADM

## 2025-08-15 RX ORDER — SODIUM CHLORIDE, SODIUM LACTATE, POTASSIUM CHLORIDE, CALCIUM CHLORIDE 600; 310; 30; 20 MG/100ML; MG/100ML; MG/100ML; MG/100ML
INJECTION, SOLUTION INTRAVENOUS CONTINUOUS PRN
Status: DISCONTINUED | OUTPATIENT
Start: 2025-08-15 | End: 2025-08-15 | Stop reason: SURG

## 2025-08-15 RX ORDER — LIDOCAINE HYDROCHLORIDE 10 MG/ML
INJECTION, SOLUTION EPIDURAL; INFILTRATION; INTRACAUDAL; PERINEURAL AS NEEDED
Status: DISCONTINUED | OUTPATIENT
Start: 2025-08-15 | End: 2025-08-15 | Stop reason: SURG

## 2025-08-15 RX ORDER — DEXTROSE MONOHYDRATE 25 G/50ML
50 INJECTION, SOLUTION INTRAVENOUS
Status: DISCONTINUED | OUTPATIENT
Start: 2025-08-15 | End: 2025-08-15 | Stop reason: HOSPADM

## 2025-08-15 RX ORDER — SODIUM CHLORIDE 9 MG/ML
125 INJECTION, SOLUTION INTRAVENOUS CONTINUOUS
Status: DISCONTINUED | OUTPATIENT
Start: 2025-08-15 | End: 2025-08-16

## 2025-08-15 RX ORDER — ONDANSETRON 2 MG/ML
INJECTION INTRAMUSCULAR; INTRAVENOUS AS NEEDED
Status: DISCONTINUED | OUTPATIENT
Start: 2025-08-15 | End: 2025-08-15 | Stop reason: SURG

## 2025-08-15 RX ORDER — HYDROMORPHONE HYDROCHLORIDE 1 MG/ML
0.2 INJECTION, SOLUTION INTRAMUSCULAR; INTRAVENOUS; SUBCUTANEOUS EVERY 5 MIN PRN
Status: DISCONTINUED | OUTPATIENT
Start: 2025-08-15 | End: 2025-08-15 | Stop reason: HOSPADM

## 2025-08-15 RX ORDER — HYDROMORPHONE HYDROCHLORIDE 1 MG/ML
0.6 INJECTION, SOLUTION INTRAMUSCULAR; INTRAVENOUS; SUBCUTANEOUS EVERY 5 MIN PRN
Status: DISCONTINUED | OUTPATIENT
Start: 2025-08-15 | End: 2025-08-15 | Stop reason: HOSPADM

## 2025-08-15 RX ORDER — NALOXONE HYDROCHLORIDE 0.4 MG/ML
0.08 INJECTION, SOLUTION INTRAMUSCULAR; INTRAVENOUS; SUBCUTANEOUS AS NEEDED
Status: DISCONTINUED | OUTPATIENT
Start: 2025-08-15 | End: 2025-08-15 | Stop reason: HOSPADM

## 2025-08-15 RX ORDER — PHENYLEPHRINE HCL 10 MG/ML
VIAL (ML) INJECTION AS NEEDED
Status: DISCONTINUED | OUTPATIENT
Start: 2025-08-15 | End: 2025-08-15 | Stop reason: SURG

## 2025-08-15 RX ADMIN — LIDOCAINE HYDROCHLORIDE 50 MG: 10 INJECTION, SOLUTION EPIDURAL; INFILTRATION; INTRACAUDAL; PERINEURAL at 20:46:00

## 2025-08-15 RX ADMIN — SODIUM CHLORIDE, SODIUM LACTATE, POTASSIUM CHLORIDE, CALCIUM CHLORIDE: 600; 310; 30; 20 INJECTION, SOLUTION INTRAVENOUS at 20:41:00

## 2025-08-15 RX ADMIN — PHENYLEPHRINE HCL 150 MCG: 10 MG/ML VIAL (ML) INJECTION at 20:58:00

## 2025-08-15 RX ADMIN — DEXAMETHASONE SODIUM PHOSPHATE 4 MG: 4 MG/ML VIAL (ML) INJECTION at 20:51:00

## 2025-08-15 RX ADMIN — ONDANSETRON 4 MG: 2 INJECTION INTRAMUSCULAR; INTRAVENOUS at 20:51:00

## 2025-08-15 RX ADMIN — PHENYLEPHRINE HCL 150 MCG: 10 MG/ML VIAL (ML) INJECTION at 20:56:00

## 2025-08-15 RX ADMIN — PHENYLEPHRINE HCL 100 MCG: 10 MG/ML VIAL (ML) INJECTION at 21:01:00

## 2025-08-15 RX ADMIN — PHENYLEPHRINE HCL 150 MCG: 10 MG/ML VIAL (ML) INJECTION at 20:53:00

## 2025-08-18 ENCOUNTER — TELEPHONE (OUTPATIENT)
Dept: FAMILY MEDICINE CLINIC | Facility: CLINIC | Age: 60
End: 2025-08-18

## (undated) DEVICE — CANISTER SUCT 1200CC LID BLU HRD ADPT AUTO

## (undated) DEVICE — KIT CUSTOM ENDOPROCEDURE STERIS

## (undated) DEVICE — Device

## (undated) DEVICE — CANNULA NSL AD W/ FLTR 14FT O2/CO2

## (undated) DEVICE — DEVICE INFL 60ML 15ATM PRSS COOK SPHR

## (undated) DEVICE — KIT VLV 5 PC AIR H2O SUCT BX ENDOGATOR CONN

## (undated) DEVICE — BITEBLOCK ENDOSCP 60FR MAXI STRP

## (undated) DEVICE — KIT MFLD FOR SPEC COLL

## (undated) DEVICE — ELECTRODE MONIT RED DOT 3PK